# Patient Record
Sex: MALE | Race: WHITE | Employment: FULL TIME | URBAN - METROPOLITAN AREA
[De-identification: names, ages, dates, MRNs, and addresses within clinical notes are randomized per-mention and may not be internally consistent; named-entity substitution may affect disease eponyms.]

---
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
## 2022-01-16 ASSESSMENT — ENCOUNTER SYMPTOMS
DIARRHEA: 0
SORE THROAT: 1

## 2022-08-01 ENCOUNTER — HOSPITAL ENCOUNTER (EMERGENCY)
Age: 22
Discharge: HOME | End: 2022-08-01
Payer: COMMERCIAL

## 2022-08-01 VITALS
HEIGHT: 70 IN | OXYGEN SATURATION: 98 % | HEART RATE: 57 BPM | TEMPERATURE: 98.3 F | WEIGHT: 180 LBS | BODY MASS INDEX: 25.77 KG/M2 | SYSTOLIC BLOOD PRESSURE: 127 MMHG | RESPIRATION RATE: 16 BRPM | DIASTOLIC BLOOD PRESSURE: 50 MMHG

## 2022-08-01 DIAGNOSIS — T24.621A: Primary | ICD-10-CM

## 2022-08-01 DIAGNOSIS — T24.622A: ICD-10-CM

## 2022-08-01 DIAGNOSIS — L03.119 CELLULITIS OF LOWER EXTREMITY, UNSPECIFIED LATERALITY: ICD-10-CM

## 2022-08-01 PROCEDURE — 99283 EMERGENCY DEPT VISIT LOW MDM: CPT

## 2022-08-01 RX ORDER — CEPHALEXIN 500 MG/1
500 CAPSULE ORAL 4 TIMES DAILY
Qty: 40 CAPSULE | Refills: 0 | Status: SHIPPED | OUTPATIENT
Start: 2022-08-01 | End: 2022-08-11

## 2022-08-01 RX ORDER — SULFAMETHOXAZOLE AND TRIMETHOPRIM 800; 160 MG/1; MG/1
2 TABLET ORAL 2 TIMES DAILY
Qty: 40 TABLET | Refills: 0 | Status: SHIPPED | OUTPATIENT
Start: 2022-08-01 | End: 2022-08-11

## 2022-08-01 ASSESSMENT — PAIN - FUNCTIONAL ASSESSMENT: PAIN_FUNCTIONAL_ASSESSMENT: NONE - DENIES PAIN

## 2022-08-01 ASSESSMENT — ENCOUNTER SYMPTOMS
ABDOMINAL PAIN: 0
COUGH: 0

## 2022-08-01 NOTE — ED PROVIDER NOTES
[facility] ED  eMERGENCY dEPARTMENT eNCOUnter      Pt Name: Dave Abreu  MRN: [id number]  [place] 2000  Date of evaluation: 8/1/2022  Provider: Ivana Chung PA-C      HISTORY OF PRESENT ILLNESS    HPI  Dave Abreu is a 25 y.o. male, with PMH of asthma, ADHD, who presents to the ED with CC of bilateral knee redness and oozing after prolonged exposure to concrete 2 days ago. Patient states that once he realized, he washed it off with copious amounts of water and applied hydrogen peroxide twice. No further care was taken. He presents today with honey-colored oozing from bilateral burn sites and surround erythema. Denies fever. No streaking. No notable lymph nodes. REVIEW OF SYSTEMS     Review of Systems   Constitutional:  Negative for fatigue and fever. HENT:  Negative for congestion. Respiratory:  Negative for cough. Cardiovascular:  Negative for chest pain. Gastrointestinal:  Negative for abdominal pain. Genitourinary:  Negative for hematuria. Skin:  Positive for wound. Neurological:  Negative for headaches. Hematological:  Negative for adenopathy. Psychiatric/Behavioral:  Negative for confusion.       PAST MEDICAL HISTORY     Past Medical History:   Diagnosis Date    ADHD (attention deficit hyperactivity disorder)     Asthma     Essential hypertension 3/7/2017    Exercise-induced bronchospasm 8/3/2017    Hypermobility syndrome     Hypothyroidism 8/4/2017    Mental and behavioral problems with learning        SURGICAL HISTORY       Past Surgical History:   Procedure Laterality Date    ARTHROSCOPY / ARTHROTOMY KNEE Right 12/14/2018    RIGHT KNEE ARTHROSCOPY KNEE LATERAL MENISCECTOMY, PAT ON ADMIT performed by Arnoldo Odonnell MD at [address] [place] [address]       Discharge Medication List as of 8/1/2022  3:58 PM          ALLERGIES     Pineapple    FAMILY HISTORY       Family History   Problem Relation Age of Onset Asthma Sister         under control    Cancer Maternal Grandmother 48        lung    Cancer Maternal Grandfather         prostate        SOCIAL HISTORY       Social History     Socioeconomic History    Marital status: Single     Spouse name: None    Number of children: None    Years of education: None    Highest education level: None   Tobacco Use    Smoking status: Never    Smokeless tobacco: Never   Vaping Use    Vaping Use: Every day   Substance and Sexual Activity    Alcohol use: No    Drug use: No    Sexual activity: Yes     Partners: Female       PHYSICAL EXAM       ED Triage Vitals [08/01/22 1516]   BP Temp Temp Source Heart Rate Resp SpO2 Height Weight   (!) 127/50 98.3 °F (36.8 °C) Temporal 57 16 98 % 5' 10\" (1.778 m) 180 lb (81.6 kg)       Physical Exam  Vitals and nursing note reviewed. Constitutional:       Appearance: He is not toxic-appearing. HENT:      Head: Normocephalic. Mouth/Throat:      Mouth: Mucous membranes are moist.   Eyes:      Extraocular Movements: Extraocular movements intact. Pupils: Pupils are equal, round, and reactive to light. Cardiovascular:      Rate and Rhythm: Normal rate. Pulses: Normal pulses. Pulmonary:      Effort: Pulmonary effort is normal. No respiratory distress. Musculoskeletal:         General: Normal range of motion. Cervical back: Normal range of motion. Skin:     Capillary Refill: Capillary refill takes less than 2 seconds. Comments: Appearance of partial thickness burns to bilateral knees. R>L with honey colors oozing present from wounds and mild surrounding erythema. No lymphangitis noted. NVI distally. No palpable lymphadenopathy. Neurological:      General: No focal deficit present. Mental Status: He is alert and oriented to person, place, and time.    Psychiatric:         Mood and Affect: Mood normal.         Behavior: Behavior normal.       Procedures    MDM:   Vitals:    Vitals:    08/01/22 1516   BP: (!) 127/50 Pulse: 57   Resp: 16   Temp: 98.3 °F (36.8 °C)   TempSrc: Temporal   SpO2: 98%   Weight: 180 lb (81.6 kg)   Height: 5' 10\" (1.778 m)       Patient presents to ED for complaint, as described above. Honey colored crusting present concern for wound infection vs infected chemical burn. Patient copiously irrigated with water. Bactrim/Keflex prescribed at today's visit. Wound was cleansed extensively and bandaged in ED. Wound care discussed for home, including soap and water twice daily. F/u with PCP in a few days or return if needed. Standard anticipatory guidance given to patient upon discharge. I have given them a specific time frame in which to follow-up and who to follow-up with. I have also advised them that they should return to the emergency department if they get worse, or not getting better or develop any new or concerning symptoms. Patient demonstrates understanding. FINAL IMPRESSION      1. Partial thickness chemical burn of right knee, initial encounter    2. Cellulitis of lower extremity, unspecified laterality    3. Partial thickness chemical burn of left knee, initial encounter          DISPOSITION/PLAN   DISPOSITION Decision To Discharge 08/01/2022 04:04:14 PM      PATIENT REFERRED TO:  Brynn Pham MD  [address] [facility] [address]  [phone]    In 2 days      DISCHARGE MEDICATIONS:  Discharge Medication List as of 8/1/2022  3:58 PM        START taking these medications    Details   sulfamethoxazole-trimethoprim (BACTRIM DS) 800-160 MG per tablet Take 2 tablets by mouth in the morning and 2 tablets before bedtime. Do all this for 10 days. , Disp-40 tablet, R-0Normal      cephALEXin (KEFLEX) 500 MG capsule Take 1 capsule by mouth in the morning and 1 capsule at noon and 1 capsule in the evening and 1 capsule before bedtime. Do all this for 10 days. , Disp-40 capsule, R-0Normal                  Alyssa Irizarry PA-C (electronically signed)  Emergency Physician Assistant       [facility] Faisal Mccann PA-C  08/01/22 7450

## 2022-08-01 NOTE — ED NOTES
Wound care performed; pt tolerated well. Explained discharge instructions and prescriptions to patient. Went over discharge diagnosis and pertinent educational material with patient. Patient stated understanding of discharge diagnosis, instructions, and prescriptions. Patient denies any questions at this time, all concerns addressed. No signs or symptoms of pain noted at this time. A/0 x3, ambulatory, resps even and unlabored on room air. Follow up instructions and reasons to return to ER reviewed. Patient denies needs at time of discharge.         Deepika Resendiz RN  08/01/22 0561

## 2022-08-01 NOTE — ED TRIAGE NOTES
Pt presents to ED with abrasions to bilateral knees since Saturday. 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

## 2022-11-28 ENCOUNTER — OFFICE VISIT (OUTPATIENT)
Dept: FAMILY MEDICINE CLINIC | Age: 22
End: 2022-11-28
Payer: COMMERCIAL

## 2022-11-28 ENCOUNTER — HOSPITAL ENCOUNTER (OUTPATIENT)
Dept: LAB | Age: 22
Discharge: HOME | End: 2022-11-28
Payer: COMMERCIAL

## 2022-11-28 VITALS
BODY MASS INDEX: 26.05 KG/M2 | WEIGHT: 182 LBS | OXYGEN SATURATION: 96 % | RESPIRATION RATE: 16 BRPM | DIASTOLIC BLOOD PRESSURE: 66 MMHG | HEIGHT: 70 IN | SYSTOLIC BLOOD PRESSURE: 100 MMHG | HEART RATE: 89 BPM | TEMPERATURE: 98.4 F

## 2022-11-28 DIAGNOSIS — R22.0 NODULE OF SKIN OF HEAD: ICD-10-CM

## 2022-11-28 DIAGNOSIS — R10.12 BILATERAL UPPER ABDOMINAL PAIN: Primary | ICD-10-CM

## 2022-11-28 DIAGNOSIS — R11.2 NAUSEA AND VOMITING, UNSPECIFIED VOMITING TYPE: ICD-10-CM

## 2022-11-28 DIAGNOSIS — R10.11 BILATERAL UPPER ABDOMINAL PAIN: Primary | ICD-10-CM

## 2022-11-28 DIAGNOSIS — R10.11 BILATERAL UPPER ABDOMINAL PAIN: ICD-10-CM

## 2022-11-28 DIAGNOSIS — R10.12 BILATERAL UPPER ABDOMINAL PAIN: ICD-10-CM

## 2022-11-28 LAB
ALBUMIN SERPL-MCNC: 4.8 G/DL (ref 3.5–4.6)
ALP BLD-CCNC: 83 U/L (ref 35–104)
ALT SERPL-CCNC: 11 U/L (ref 0–41)
ANION GAP SERPL CALCULATED.3IONS-SCNC: 15 MEQ/L (ref 9–15)
AST SERPL-CCNC: 15 U/L (ref 0–40)
BASOPHILS ABSOLUTE: 0 K/UL (ref 0–0.2)
BASOPHILS RELATIVE PERCENT: 0.5 %
BILIRUB SERPL-MCNC: 0.3 MG/DL (ref 0.2–0.7)
BUN BLDV-MCNC: 7 MG/DL (ref 6–20)
CALCIUM SERPL-MCNC: 9.7 MG/DL (ref 8.5–9.9)
CHLORIDE BLD-SCNC: 100 MEQ/L (ref 95–107)
CO2: 26 MEQ/L (ref 20–31)
CREAT SERPL-MCNC: 0.74 MG/DL (ref 0.7–1.2)
EOSINOPHILS ABSOLUTE: 0.1 K/UL (ref 0–0.7)
EOSINOPHILS RELATIVE PERCENT: 1.1 %
GFR SERPL CREATININE-BSD FRML MDRD: >60 ML/MIN/{1.73_M2}
GLOBULIN: 2.7 G/DL (ref 2.3–3.5)
GLUCOSE BLD-MCNC: 94 MG/DL (ref 70–99)
HCT VFR BLD CALC: 45.1 % (ref 42–52)
HEMOGLOBIN: 15.2 G/DL (ref 14–18)
LIPASE: 24 U/L (ref 12–95)
LYMPHOCYTES ABSOLUTE: 1.8 K/UL (ref 1–4.8)
LYMPHOCYTES RELATIVE PERCENT: 26.1 %
MCH RBC QN AUTO: 31.6 PG (ref 27–31.3)
MCHC RBC AUTO-ENTMCNC: 33.6 % (ref 33–37)
MCV RBC AUTO: 93.8 FL (ref 79–92.2)
MONOCYTES ABSOLUTE: 0.6 K/UL (ref 0.2–0.8)
MONOCYTES RELATIVE PERCENT: 8.8 %
NEUTROPHILS ABSOLUTE: 4.3 K/UL (ref 1.4–6.5)
NEUTROPHILS RELATIVE PERCENT: 63.5 %
PDW BLD-RTO: 12.7 % (ref 11.5–14.5)
PLATELET # BLD: 281 K/UL (ref 130–400)
POTASSIUM SERPL-SCNC: 4.6 MEQ/L (ref 3.4–4.9)
RBC # BLD: 4.81 M/UL (ref 4.7–6.1)
SODIUM BLD-SCNC: 141 MEQ/L (ref 135–144)
TOTAL PROTEIN: 7.5 G/DL (ref 6.3–8)
WBC # BLD: 6.8 K/UL (ref 4.5–11)

## 2022-11-28 PROCEDURE — 85025 COMPLETE CBC W/AUTO DIFF WBC: CPT

## 2022-11-28 PROCEDURE — 36415 COLL VENOUS BLD VENIPUNCTURE: CPT

## 2022-11-28 PROCEDURE — 3074F SYST BP LT 130 MM HG: CPT | Performed by: FAMILY MEDICINE

## 2022-11-28 PROCEDURE — 80053 COMPREHEN METABOLIC PANEL: CPT

## 2022-11-28 PROCEDURE — 83690 ASSAY OF LIPASE: CPT

## 2022-11-28 PROCEDURE — 3078F DIAST BP <80 MM HG: CPT | Performed by: FAMILY MEDICINE

## 2022-11-28 PROCEDURE — 99214 OFFICE O/P EST MOD 30 MIN: CPT | Performed by: FAMILY MEDICINE

## 2022-11-28 RX ORDER — OMEPRAZOLE 20 MG/1
20 CAPSULE, DELAYED RELEASE ORAL
Qty: 30 CAPSULE | Refills: 0 | Status: SHIPPED | OUTPATIENT
Start: 2022-11-28

## 2022-11-28 SDOH — ECONOMIC STABILITY: FOOD INSECURITY: WITHIN THE PAST 12 MONTHS, THE FOOD YOU BOUGHT JUST DIDN'T LAST AND YOU DIDN'T HAVE MONEY TO GET MORE.: NEVER TRUE

## 2022-11-28 SDOH — ECONOMIC STABILITY: TRANSPORTATION INSECURITY
IN THE PAST 12 MONTHS, HAS THE LACK OF TRANSPORTATION KEPT YOU FROM MEDICAL APPOINTMENTS OR FROM GETTING MEDICATIONS?: NO

## 2022-11-28 SDOH — ECONOMIC STABILITY: TRANSPORTATION INSECURITY
IN THE PAST 12 MONTHS, HAS LACK OF TRANSPORTATION KEPT YOU FROM MEETINGS, WORK, OR FROM GETTING THINGS NEEDED FOR DAILY LIVING?: NO

## 2022-11-28 SDOH — ECONOMIC STABILITY: FOOD INSECURITY: WITHIN THE PAST 12 MONTHS, YOU WORRIED THAT YOUR FOOD WOULD RUN OUT BEFORE YOU GOT MONEY TO BUY MORE.: NEVER TRUE

## 2022-11-28 ASSESSMENT — ENCOUNTER SYMPTOMS
ABDOMINAL DISTENTION: 0
NAUSEA: 1
COUGH: 0
RECTAL PAIN: 0
CHOKING: 0
BLOOD IN STOOL: 0
ABDOMINAL PAIN: 1
APNEA: 0
CONSTIPATION: 0
VOMITING: 1
DIARRHEA: 0
COLOR CHANGE: 0
WHEEZING: 0
CHEST TIGHTNESS: 0

## 2022-11-28 ASSESSMENT — PATIENT HEALTH QUESTIONNAIRE - PHQ9
SUM OF ALL RESPONSES TO PHQ QUESTIONS 1-9: 0
4. FEELING TIRED OR HAVING LITTLE ENERGY: 0
2. FEELING DOWN, DEPRESSED OR HOPELESS: 0
3. TROUBLE FALLING OR STAYING ASLEEP: 0
1. LITTLE INTEREST OR PLEASURE IN DOING THINGS: 0
9. THOUGHTS THAT YOU WOULD BE BETTER OFF DEAD, OR OF HURTING YOURSELF: 0
8. MOVING OR SPEAKING SO SLOWLY THAT OTHER PEOPLE COULD HAVE NOTICED. OR THE OPPOSITE, BEING SO FIGETY OR RESTLESS THAT YOU HAVE BEEN MOVING AROUND A LOT MORE THAN USUAL: 0
6. FEELING BAD ABOUT YOURSELF - OR THAT YOU ARE A FAILURE OR HAVE LET YOURSELF OR YOUR FAMILY DOWN: 0
7. TROUBLE CONCENTRATING ON THINGS, SUCH AS READING THE NEWSPAPER OR WATCHING TELEVISION: 0
SUM OF ALL RESPONSES TO PHQ QUESTIONS 1-9: 0
SUM OF ALL RESPONSES TO PHQ9 QUESTIONS 1 & 2: 0
SUM OF ALL RESPONSES TO PHQ QUESTIONS 1-9: 0
SUM OF ALL RESPONSES TO PHQ QUESTIONS 1-9: 0
10. IF YOU CHECKED OFF ANY PROBLEMS, HOW DIFFICULT HAVE THESE PROBLEMS MADE IT FOR YOU TO DO YOUR WORK, TAKE CARE OF THINGS AT HOME, OR GET ALONG WITH OTHER PEOPLE: 0
5. POOR APPETITE OR OVEREATING: 0

## 2022-11-28 ASSESSMENT — SOCIAL DETERMINANTS OF HEALTH (SDOH): HOW HARD IS IT FOR YOU TO PAY FOR THE VERY BASICS LIKE FOOD, HOUSING, MEDICAL CARE, AND HEATING?: NOT HARD AT ALL

## 2022-11-28 NOTE — PROGRESS NOTES
Subjective:      Patient ID: Niels Gauthier is a 23 y.o. male who presents today for:     Chief Complaint   Patient presents with    Mass     Bumps of face, bottom of ears, possible cysts spanning years; one on the right just popped up     Abdominal Pain     Upper quads, in the middle; chronic off and on; no UTI symptoms     Health Maintenance     Declined vaccines        HPI  Is a very pleasant 82-year-old male presents today to follow-up. He has been experiencing nausea and vomiting over the past 3 months. He states that it only occurs in the morning and happens approximately 2-3 times per week. He states that he typically does not eat breakfast due to the symptoms. He is able to eat lunch and dinner without any difficulty. He has had looser stools and has a bowel movement approximately 1-3 times per day. He denies any melena or hematochezia. He does admit to bilateral upper abdominal pain that is intermittent. Last occurrence was approximately 1 week ago. He states the pain is typically cramping in nature. He denies any fever or chills. He denies any association with any different types of foods such as lactose products. He states that emesis is typically clear and denies any blood. Patient is also noticed a nodule that has been present on his right jaw for over 1 year. He states that it comes and goes and at times he is able to express some liquid.      Past Medical History:   Diagnosis Date    ADHD (attention deficit hyperactivity disorder)     Asthma     Essential hypertension 3/7/2017    Exercise-induced bronchospasm 8/3/2017    Hypermobility syndrome     Hypothyroidism 8/4/2017    Mental and behavioral problems with learning      Past Surgical History:   Procedure Laterality Date    ARTHROSCOPY / ARTHROTOMY KNEE Right 12/14/2018    RIGHT KNEE ARTHROSCOPY KNEE LATERAL MENISCECTOMY, PAT ON ADMIT performed by Shira Ray MD at [facility] History   Problem Relation Age of Onset    Asthma Sister         under control    Cancer Maternal Grandmother 48        lung    Cancer Maternal Grandfather         prostate     Social History     Socioeconomic History    Marital status: Single     Spouse name: Not on file    Number of children: Not on file    Years of education: Not on file    Highest education level: Not on file   Occupational History    Not on file   Tobacco Use    Smoking status: Never    Smokeless tobacco: Never   Vaping Use    Vaping Use: Every day   Substance and Sexual Activity    Alcohol use: No    Drug use: No    Sexual activity: Yes     Partners: Female   Other Topics Concern    Not on file   Social History Narrative    Not on file     Social Determinants of Health     Financial Resource Strain: Low Risk     Difficulty of Paying Living Expenses: Not hard at all   Food Insecurity: No Food Insecurity    Worried About [address] in the Last Year: Never true    [address] in the Last Year: Never true   Transportation Needs: No Transportation Needs    Lack of Transportation (Medical): No    Lack of Transportation (Non-Medical): No   Physical Activity: Not on file   Stress: Not on file   Social Connections: Not on file   Intimate Partner Violence: Not on file   Housing Stability: Not on file     No current outpatient medications on file prior to visit. No current facility-administered medications on file prior to visit. Allergies:  Pineapple    Review of Systems   Constitutional:  Negative for activity change and appetite change. Respiratory:  Negative for apnea, cough, choking, chest tightness and wheezing. Cardiovascular:  Negative for chest pain, palpitations and leg swelling. Gastrointestinal:  Positive for abdominal pain, nausea and vomiting. Negative for abdominal distention, blood in stool, constipation, diarrhea and rectal pain.    Endocrine: Negative for cold intolerance, heat intolerance, polydipsia, polyphagia and polyuria. Genitourinary:  Negative for difficulty urinating, dysuria, flank pain and frequency. Skin:  Negative for color change. Allergic/Immunologic: Negative for food allergies. Neurological:  Negative for dizziness and headaches. Psychiatric/Behavioral:  Negative for agitation, behavioral problems and suicidal ideas. The patient is not nervous/anxious. Objective:   /66 (Site: Left Upper Arm, Position: Sitting, Cuff Size: Medium Adult)   Pulse 89   Temp 98.4 °F (36.9 °C) (Oral)   Resp 16   Ht 5' 10\" (1.778 m)   Wt 182 lb (82.6 kg)   SpO2 96%   BMI 26.11 kg/m²     Physical Exam  Vitals and nursing note reviewed. Constitutional:       General: He is not in acute distress. Appearance: Normal appearance. He is well-developed. He is not diaphoretic. HENT:      Head: Normocephalic and atraumatic. Jaw: No tenderness, swelling, pain on movement or malocclusion. Salivary Glands: Right salivary gland is not diffusely enlarged. Left salivary gland is not diffusely enlarged. Nose: Nose normal.      Mouth/Throat:      Mouth: Mucous membranes are moist.      Pharynx: Oropharynx is clear. Eyes:      Conjunctiva/sclera: Conjunctivae normal.      Pupils: Pupils are equal, round, and reactive to light. Cardiovascular:      Rate and Rhythm: Normal rate and regular rhythm. Heart sounds: Normal heart sounds. No murmur heard. No friction rub. No gallop. Pulmonary:      Effort: Pulmonary effort is normal. No respiratory distress. Breath sounds: Normal breath sounds. No wheezing or rales. Chest:      Chest wall: No tenderness. Abdominal:      General: Abdomen is flat. Bowel sounds are normal.      Palpations: Abdomen is soft. There is no mass. Tenderness: There is no abdominal tenderness. There is no right CVA tenderness or left CVA tenderness. Hernia: No hernia is present. Musculoskeletal:      Cervical back: Normal range of motion. Skin:     General: Skin is warm and dry. Neurological:      Mental Status: He is alert and oriented to person, place, and time. Psychiatric:         Behavior: Behavior normal.         Thought Content: Thought content normal.         Judgment: Judgment normal.       Assessment & Plan:     1. Bilateral upper abdominal pain  Unclear etiology and currently not present therefore will investigate further and have patient evaluated by gastroenterology. Will start low-dose PPI in the interim  - CBC with Auto Differential; Future  - Comprehensive Metabolic Panel; Future  - Lipase; Future  - [address] Garcia MD, Gastroenterology, [place]    2. Nausea and vomiting, unspecified vomiting type  Symptoms may be secondary to reflux disease therefore will treat with low-dose PPI and have patient follow-up. Imaging may be required  - omeprazole (PRILOSEC) 20 MG delayed release capsule; Take 1 capsule by mouth every morning (before breakfast)  Dispense: 30 capsule; Refill: 0  - CBC with Auto Differential; Future  - Comprehensive Metabolic Panel; Future  - Lipase; Future  - [address] Garcia MD, Gastroenterology, [place]    3. Nodule of skin of head  May be subcutaneous cyst versus lymph node, due to duration, will obtain ultrasound and have patient evaluated by ENT  - Joce Mattson MD, Otolaryngology, [address]; Future    No follow-ups on file.     Vernell Vega MD

## 2022-12-05 ENCOUNTER — HOSPITAL ENCOUNTER (OUTPATIENT)
Dept: ULTRASOUND IMAGING | Age: 22
Discharge: HOME | End: 2022-12-07
Payer: COMMERCIAL

## 2022-12-05 DIAGNOSIS — R22.0 NODULE OF SKIN OF HEAD: ICD-10-CM

## 2022-12-05 PROCEDURE — 76999 ECHO EXAMINATION PROCEDURE: CPT

## 2022-12-09 ENCOUNTER — OFFICE VISIT (OUTPATIENT)
Dept: GASTROENTEROLOGY | Age: 22
End: 2022-12-09
Payer: COMMERCIAL

## 2022-12-09 VITALS
SYSTOLIC BLOOD PRESSURE: 122 MMHG | WEIGHT: 179 LBS | BODY MASS INDEX: 25.68 KG/M2 | OXYGEN SATURATION: 98 % | HEART RATE: 78 BPM | DIASTOLIC BLOOD PRESSURE: 80 MMHG

## 2022-12-09 DIAGNOSIS — R11.2 NAUSEA AND VOMITING, UNSPECIFIED VOMITING TYPE: Primary | ICD-10-CM

## 2022-12-09 PROCEDURE — 3074F SYST BP LT 130 MM HG: CPT | Performed by: SPECIALIST

## 2022-12-09 PROCEDURE — 3078F DIAST BP <80 MM HG: CPT | Performed by: SPECIALIST

## 2022-12-09 PROCEDURE — 99203 OFFICE O/P NEW LOW 30 MIN: CPT | Performed by: SPECIALIST

## 2022-12-09 ASSESSMENT — ENCOUNTER SYMPTOMS
BLOOD IN STOOL: 0
ABDOMINAL PAIN: 1
DIARRHEA: 0
VOMITING: 1
RESPIRATORY NEGATIVE: 1
NAUSEA: 0
EYES NEGATIVE: 1
RECTAL PAIN: 0
ABDOMINAL DISTENTION: 0
ANAL BLEEDING: 0
CONSTIPATION: 0

## 2022-12-09 NOTE — PROGRESS NOTES
Gastroenterology Clinic Visit    Margo King  [id number]  Chief Complaint   Patient presents with    Nausea & Vomiting    Abdominal Pain       HPI: 23 y.o. male presents to the clinic with history of intermittent emesis since last 3 to 4 months. Frequency of emesis about 3-4 times a week. No history of hematemesis. Emesis usually occurs in the morning. No history of melena. History of 20 to 25 pound weight loss in the last 9 months. No significant change in appetite, no dysphagia.,  Experience epigastric discomfort. No relation to meals. No history of fever experience night sweats. Takes ibuprofen very occasionally. History of GI bleeding social history uses e-cigarette and cannabis does not drink alcohol  Surgical history includes wrist surgery. Patient has been taking omeprazole as needed  Review of Systems   Constitutional: Negative. HENT: Negative. Eyes: Negative. Respiratory: Negative. Exercise-induced asthma   Gastrointestinal:  Positive for abdominal pain and vomiting. Negative for abdominal distention, anal bleeding, blood in stool, constipation, diarrhea, nausea and rectal pain. Genitourinary: Negative. Musculoskeletal: Negative. Neurological: Negative. Psychiatric/Behavioral: Negative.         Past Medical History:   Diagnosis Date    ADHD (attention deficit hyperactivity disorder)     Asthma     Essential hypertension 3/7/2017    Exercise-induced bronchospasm 8/3/2017    Hypermobility syndrome     Hypothyroidism 8/4/2017    Mental and behavioral problems with learning       Past Surgical History:   Procedure Laterality Date    ARTHROSCOPY / ARTHROTOMY KNEE Right 12/14/2018    RIGHT KNEE ARTHROSCOPY KNEE LATERAL MENISCECTOMY, PAT ON ADMIT performed by Livia [facility]ilva [facility] at [facility]     Current Outpatient Medications on File Prior to Visit   Medication Sig Dispense Refill    omeprazole (PRILOSEC) 20 MG delayed release capsule Take 1 capsule by mouth every morning (before breakfast) 30 capsule 0     No current facility-administered medications on file prior to visit. Family History   Problem Relation Age of Onset    Asthma Sister         under control    Cancer Maternal Grandmother 48        lung    Cancer Maternal Grandfather         prostate      Social History     Socioeconomic History    Marital status: Single     Spouse name: None    Number of children: None    Years of education: None    Highest education level: None   Tobacco Use    Smoking status: Never    Smokeless tobacco: Never   Vaping Use    Vaping Use: Every day   Substance and Sexual Activity    Alcohol use: No    Drug use: No    Sexual activity: Yes     Partners: Female     Social Determinants of Health     Financial Resource Strain: Low Risk     Difficulty of Paying Living Expenses: Not hard at all   Food Insecurity: No Food Insecurity    Worried About [address] in the Last Year: Never true    [address] in the Last Year: Never true   Transportation Needs: No Transportation Needs    Lack of Transportation (Medical): No    Lack of Transportation (Non-Medical): No       Blood pressure 122/80, pulse 78, weight 179 lb (81.2 kg), SpO2 98 %. Physical Exam  Constitutional:       Appearance: He is well-developed. HENT:      Head: Normocephalic. Eyes:      Pupils: Pupils are equal, round, and reactive to light. Cardiovascular:      Rate and Rhythm: Normal rate and regular rhythm. Heart sounds: Normal heart sounds. Pulmonary:      Effort: Pulmonary effort is normal.      Breath sounds: Normal breath sounds. Abdominal:      General: Bowel sounds are normal.      Palpations: Abdomen is soft. Comments: Soft nontender no palpable mass   Skin:     General: Skin is warm and dry. Neurological:      Mental Status: He is alert.        Laboratory, Pathology, Radiology reviewed indetail with relevant important investigations summarized below:  Lab Results   Component Value Date    WBC 6.8 11/28/2022    HGB 15.2 11/28/2022    HCT 45.1 11/28/2022    MCV 93.8 (H) 11/28/2022     11/28/2022     Lab Results   Component Value Date    ALT 11 11/28/2022    AST 15 11/28/2022    ALKPHOS 83 11/28/2022    BILITOT 0.3 11/28/2022       US SOFT TISSUE LIMITED AREA    Result Date: 12/5/2022  EXAMINATION: SOFT TISSUE ULTRASOUND 12/5/2022 1:41 pm COMPARISON: None. HISTORY: ORDERING SYSTEM PROVIDED HISTORY: Nodule of skin of head TECHNOLOGIST PROVIDED HISTORY: This procedure can be scheduled via [employer]. Access your [employer] account by visiting [facility]. Reason for exam:->right sided  nodule  along upper jaw line What reading provider will be dictating this exam?->CRC FINDINGS: Present in the right TMJ region is a 0.8 cm lymph node. No other abnormalities. 0.8 cm lymph node in the region the right TMJ. RECOMMENDATION: Clinical follow-up. Endoscopic investigations:     Assessmentand Plan:  23 y.o. male with history of recurrent nausea and vomiting. Rule out cannabis induced hyperemesis gastritis or ulcer disease. Patient advised to hold the cannabis and continue omeprazole. If symptoms persist will consider doing EGD. To call me in the next 3 to 4 weeks   Diagnosis Orders   1. Nausea and vomiting, unspecified vomiting type          No follow-ups on file. Rashmi Daley MD   Staff Gastroenterologist  [facility]    Please note this report has been partially produced using speech recognition software and may cause contain errors related to thatsystem including grammar, punctuation and spelling as well as words and phrases that may seem inappropriate. If there are questions or concerns please feel free to contact me to clarify.

## 2024-04-30 ENCOUNTER — HOSPITAL ENCOUNTER (EMERGENCY)
Age: 24
Discharge: HOME | End: 2024-04-30

## 2024-04-30 VITALS
WEIGHT: 180 LBS | HEIGHT: 70 IN | DIASTOLIC BLOOD PRESSURE: 75 MMHG | HEART RATE: 97 BPM | TEMPERATURE: 98.7 F | OXYGEN SATURATION: 98 % | RESPIRATION RATE: 16 BRPM | SYSTOLIC BLOOD PRESSURE: 127 MMHG | BODY MASS INDEX: 25.77 KG/M2

## 2024-04-30 DIAGNOSIS — K02.9 PAIN DUE TO DENTAL CARIES: Primary | ICD-10-CM

## 2024-04-30 DIAGNOSIS — K04.7 DENTAL INFECTION: ICD-10-CM

## 2024-04-30 DIAGNOSIS — K02.9 DENTAL DECAY: ICD-10-CM

## 2024-04-30 PROCEDURE — 99283 EMERGENCY DEPT VISIT LOW MDM: CPT

## 2024-04-30 PROCEDURE — 6370000000 HC RX 637 (ALT 250 FOR IP)

## 2024-04-30 RX ORDER — AMOXICILLIN AND CLAVULANATE POTASSIUM 875; 125 MG/1; MG/1
1 TABLET, FILM COATED ORAL ONCE
Status: COMPLETED | OUTPATIENT
Start: 2024-04-30 | End: 2024-04-30

## 2024-04-30 RX ORDER — AMOXICILLIN AND CLAVULANATE POTASSIUM 875; 125 MG/1; MG/1
1 TABLET, FILM COATED ORAL 2 TIMES DAILY
Qty: 20 TABLET | Refills: 0 | Status: SHIPPED | OUTPATIENT
Start: 2024-04-30 | End: 2024-05-10

## 2024-04-30 RX ORDER — IBUPROFEN 600 MG/1
600 TABLET ORAL EVERY 8 HOURS PRN
Qty: 20 TABLET | Refills: 0 | Status: SHIPPED | OUTPATIENT
Start: 2024-04-30

## 2024-04-30 RX ORDER — IBUPROFEN 600 MG/1
600 TABLET ORAL ONCE
Status: COMPLETED | OUTPATIENT
Start: 2024-04-30 | End: 2024-04-30

## 2024-04-30 RX ADMIN — IBUPROFEN 600 MG: 600 TABLET, FILM COATED ORAL at 18:24

## 2024-04-30 RX ADMIN — AMOXICILLIN AND CLAVULANATE POTASSIUM 1 TABLET: 875; 125 TABLET, FILM COATED ORAL at 18:24

## 2024-04-30 ASSESSMENT — ENCOUNTER SYMPTOMS
SORE THROAT: 0
ABDOMINAL PAIN: 0
NAUSEA: 0
COUGH: 0
DIARRHEA: 0
SHORTNESS OF BREATH: 0
VOMITING: 0
BACK PAIN: 0

## 2024-04-30 ASSESSMENT — PAIN DESCRIPTION - DESCRIPTORS: DESCRIPTORS: STABBING

## 2024-04-30 ASSESSMENT — PAIN DESCRIPTION - FREQUENCY: FREQUENCY: CONTINUOUS

## 2024-04-30 ASSESSMENT — PAIN DESCRIPTION - PAIN TYPE: TYPE: ACUTE PAIN

## 2024-04-30 ASSESSMENT — LIFESTYLE VARIABLES
HOW MANY STANDARD DRINKS CONTAINING ALCOHOL DO YOU HAVE ON A TYPICAL DAY: PATIENT DOES NOT DRINK
HOW OFTEN DO YOU HAVE A DRINK CONTAINING ALCOHOL: NEVER

## 2024-04-30 ASSESSMENT — PAIN - FUNCTIONAL ASSESSMENT: PAIN_FUNCTIONAL_ASSESSMENT: 0-10

## 2024-04-30 ASSESSMENT — PAIN DESCRIPTION - ORIENTATION: ORIENTATION: RIGHT;POSTERIOR

## 2024-04-30 ASSESSMENT — PAIN SCALES - GENERAL: PAINLEVEL_OUTOF10: 8

## 2024-04-30 ASSESSMENT — PAIN DESCRIPTION - LOCATION: LOCATION: TEETH

## 2024-04-30 NOTE — DISCHARGE INSTRUCTIONS
Please follow-up with the dentist for tooth extraction.  Return to emergency department for any new or worsening symptoms.

## 2024-04-30 NOTE — ED PROVIDER NOTES
[facility] ED  eMERGENCYdEPARTMENT eNCOUnter      Pt Name: Jessee Hill  MRN: [id number]  Birthdate 2000of evaluation: 4/30/2024  Provider:MARCOS Howell CNP    CHIEF COMPLAINT       Chief Complaint   Patient presents with    Dental Pain     Right back dental pain         HISTORY OF PRESENT ILLNESS  (Location/Symptom, Timing/Onset, Context/Setting, Quality, Duration, Modifying Factors, Severity.)   Jessee Hill is a 23 y.o. male history of ADHD, hypertension, asthma, who presents to the emergency department with dental pain.  Patient presents to the emergency department with complaints of right lower molar pain that is been going on for the past couple months.  He states the tooth became sensitive over the last few days.  He is taking Tylenol for pain control, he rates his dental pain an 8 out of 10 ache.  He is planning on establishing with [facility] and dentistry for tooth extraction.  He denies any chest pain, shortness of breath, abdominal pain, nausea, vomiting, diarrhea, fever, chills, headache or recent known illness.    HPI    Nursing Notes were reviewed and I agree.    REVIEW OF SYSTEMS    (2-9 systems for level 4, 10 or more for level 5)     Review of Systems   Constitutional:  Negative for activity change, chills and fever.   HENT:  Positive for dental problem. Negative for ear pain and sore throat.    Eyes:  Negative for visual disturbance.   Respiratory:  Negative for cough and shortness of breath.    Cardiovascular:  Negative for chest pain, palpitations and leg swelling.   Gastrointestinal:  Negative for abdominal pain, diarrhea, nausea and vomiting.   Genitourinary:  Negative for dysuria.   Musculoskeletal:  Negative for back pain.   Skin:  Negative for rash.   Neurological:  Negative for dizziness and weakness.        as noted above the remainder of the review of systems was reviewed and negative.       PAST MEDICAL HISTORY     Past Medical History:   Diagnosis Date

## 2024-04-30 NOTE — ED TRIAGE NOTES
Patient presents to ED with c/o left bottom dental pain that has been an issue for several months . He plans to see a dentist eventually

## 2025-08-04 ENCOUNTER — OFFICE VISIT (OUTPATIENT)
Dept: FAMILY MEDICINE CLINIC | Age: 25
End: 2025-08-04

## 2025-08-04 VITALS
HEIGHT: 71 IN | HEART RATE: 84 BPM | OXYGEN SATURATION: 96 % | SYSTOLIC BLOOD PRESSURE: 126 MMHG | TEMPERATURE: 97.3 F | DIASTOLIC BLOOD PRESSURE: 60 MMHG | WEIGHT: 208 LBS | BODY MASS INDEX: 29.12 KG/M2

## 2025-08-04 DIAGNOSIS — L23.7 POISON IVY: Primary | ICD-10-CM

## 2025-08-04 PROCEDURE — 96372 THER/PROPH/DIAG INJ SC/IM: CPT | Performed by: NURSE PRACTITIONER

## 2025-08-04 PROCEDURE — 3074F SYST BP LT 130 MM HG: CPT | Performed by: NURSE PRACTITIONER

## 2025-08-04 PROCEDURE — 99213 OFFICE O/P EST LOW 20 MIN: CPT | Performed by: NURSE PRACTITIONER

## 2025-08-04 PROCEDURE — 3078F DIAST BP <80 MM HG: CPT | Performed by: NURSE PRACTITIONER

## 2025-08-04 RX ADMIN — Medication 60 MG: at 10:30

## 2025-08-04 SDOH — ECONOMIC STABILITY: FOOD INSECURITY: WITHIN THE PAST 12 MONTHS, THE FOOD YOU BOUGHT JUST DIDN'T LAST AND YOU DIDN'T HAVE MONEY TO GET MORE.: NEVER TRUE

## 2025-08-04 SDOH — ECONOMIC STABILITY: FOOD INSECURITY: WITHIN THE PAST 12 MONTHS, YOU WORRIED THAT YOUR FOOD WOULD RUN OUT BEFORE YOU GOT MONEY TO BUY MORE.: NEVER TRUE

## 2025-08-04 ASSESSMENT — ENCOUNTER SYMPTOMS
RHINORRHEA: 0
COUGH: 0
SHORTNESS OF BREATH: 0
COLOR CHANGE: 1
SORE THROAT: 0
WHEEZING: 0

## 2025-08-04 ASSESSMENT — PATIENT HEALTH QUESTIONNAIRE - PHQ9
1. LITTLE INTEREST OR PLEASURE IN DOING THINGS: NOT AT ALL
SUM OF ALL RESPONSES TO PHQ QUESTIONS 1-9: 0
2. FEELING DOWN, DEPRESSED OR HOPELESS: NOT AT ALL
SUM OF ALL RESPONSES TO PHQ QUESTIONS 1-9: 0

## (undated) DEVICE — GLOVE ORANGE PI 8   MSG9080

## (undated) DEVICE — MAT FLR SURG QUICKWICK 28X54 IN DISP

## (undated) DEVICE — 3M™ STERI-STRIP™ REINFORCED ADHESIVE SKIN CLOSURES, R1547, 1/2 IN X 4 IN (12 MM X 100 MM), 6 STRIPS/ENVELOPE: Brand: 3M™ STERI-STRIP™

## (undated) DEVICE — KNEE ARTHROSCOPY II-LF: Brand: MEDLINE INDUSTRIES, INC.

## (undated) DEVICE — 4.5 MM CANNULA AND OBTURATOR,                                    CONICAL TIP, DISTAL HOLES

## (undated) DEVICE — TUBING PMP L8FT LNG W/ CONN FOR AR-6400 REDEUCE

## (undated) DEVICE — 3M™ STERI-DRAPE™ U-DRAPE 1015: Brand: STERI-DRAPE™

## (undated) DEVICE — MEDI-VAC NON-CONDUCTIVE SUCTION TUBING: Brand: CARDINAL HEALTH

## (undated) DEVICE — UNDERCAST PADDING: Brand: DEROYAL

## (undated) DEVICE — CHLORAPREP 26ML ORANGE

## (undated) DEVICE — TUBE IRRIG L8IN LNG PT W/ CONN FOR PMP SYS REDEUCE

## (undated) DEVICE — 2000CC GUARDIAN II: Brand: GUARDIAN

## (undated) DEVICE — SUTURE ETHLN SZ 3-0 L18IN NONABSORBABLE BLK PS-2 L19MM 3/8 1669H

## (undated) DEVICE — [AGGRESSIVE PLUS CUTTER, ARTHROSCOPIC SHAVER BLADE,  DO NOT RESTERILIZE,  DO NOT USE IF PACKAGE IS DAMAGED,  KEEP DRY,  KEEP AWAY FROM SUNLIGHT]: Brand: FORMULA

## (undated) DEVICE — BASIC SINGLE BASIN-LF: Brand: MEDLINE INDUSTRIES, INC.

## (undated) DEVICE — GLOVE ORANGE PI 7 1/2   MSG9075